# Patient Record
Sex: FEMALE
[De-identification: names, ages, dates, MRNs, and addresses within clinical notes are randomized per-mention and may not be internally consistent; named-entity substitution may affect disease eponyms.]

---

## 2018-05-21 ENCOUNTER — HOSPITAL ENCOUNTER (OUTPATIENT)
Dept: HOSPITAL 82 - ICU | Age: 73
Setting detail: OBSERVATION
LOS: 1 days | Discharge: HOME | End: 2018-05-22
Attending: INTERNAL MEDICINE | Admitting: INTERNAL MEDICINE
Payer: MEDICARE

## 2018-05-21 VITALS
HEIGHT: 57 IN | WEIGHT: 126.12 LBS | DIASTOLIC BLOOD PRESSURE: 61 MMHG | SYSTOLIC BLOOD PRESSURE: 175 MMHG | BODY MASS INDEX: 27.21 KG/M2

## 2018-05-21 VITALS — DIASTOLIC BLOOD PRESSURE: 57 MMHG | SYSTOLIC BLOOD PRESSURE: 142 MMHG

## 2018-05-21 VITALS — DIASTOLIC BLOOD PRESSURE: 53 MMHG | SYSTOLIC BLOOD PRESSURE: 117 MMHG

## 2018-05-21 VITALS — SYSTOLIC BLOOD PRESSURE: 132 MMHG | DIASTOLIC BLOOD PRESSURE: 62 MMHG

## 2018-05-21 VITALS — SYSTOLIC BLOOD PRESSURE: 126 MMHG | DIASTOLIC BLOOD PRESSURE: 64 MMHG

## 2018-05-21 VITALS — DIASTOLIC BLOOD PRESSURE: 39 MMHG | SYSTOLIC BLOOD PRESSURE: 91 MMHG

## 2018-05-21 DIAGNOSIS — Z95.1: ICD-10-CM

## 2018-05-21 DIAGNOSIS — I25.10: ICD-10-CM

## 2018-05-21 DIAGNOSIS — R55: Primary | ICD-10-CM

## 2018-05-21 DIAGNOSIS — R94.31: ICD-10-CM

## 2018-05-21 DIAGNOSIS — E78.5: ICD-10-CM

## 2018-05-21 DIAGNOSIS — Z79.84: ICD-10-CM

## 2018-05-21 DIAGNOSIS — E11.9: ICD-10-CM

## 2018-05-21 DIAGNOSIS — I11.9: ICD-10-CM

## 2018-05-21 DIAGNOSIS — Z79.899: ICD-10-CM

## 2018-05-21 NOTE — NUR
PT RESTING IN BED WITH EYES CLOSED. RESP ARE EVEN AND UNLABORED. NO DISTRESS
NOTED. SR ON MONITOR AND NOTED SOME SB. CALL LIGHT IN REACH. WILL CONTINUE
TO DA

## 2018-05-21 NOTE — NUR
PT RESTING IN BED AWAKE. FAMILY IN ROOM. PT IS ALERT AND ORIENTED X3. PERRLA.
RESP ARE EVEN AND UNLABORED. NO DISTRESS NOTED. LUNGS ARE CLEAR. HR REGULAR.
SR ON MONITOR. PT REPORTS DIZZINESS UPON STANDING. ORTHOSTATIC BPS OBTAINED.
LYING /59, SITTIGN /53, STANDING /61. PT DID BECOME
UNSTEADY UPON STANDING AND REPORTED DIZZINESS. NO EDEMA NOTED. PULSES PALPABLE
THROUGHOUT. BS ACTIVE. #20 RAC SALINE LOCKED. NO REDNESS OT EDEMA NOTED. CALL
LIGHT IN REACH. WILL CONTINUE TO MONITOR

## 2018-05-22 VITALS — DIASTOLIC BLOOD PRESSURE: 65 MMHG | SYSTOLIC BLOOD PRESSURE: 116 MMHG

## 2018-05-22 VITALS — SYSTOLIC BLOOD PRESSURE: 101 MMHG | DIASTOLIC BLOOD PRESSURE: 49 MMHG

## 2018-05-22 VITALS — SYSTOLIC BLOOD PRESSURE: 124 MMHG | DIASTOLIC BLOOD PRESSURE: 45 MMHG

## 2018-05-22 VITALS — DIASTOLIC BLOOD PRESSURE: 64 MMHG | SYSTOLIC BLOOD PRESSURE: 143 MMHG

## 2018-05-22 VITALS — SYSTOLIC BLOOD PRESSURE: 103 MMHG | DIASTOLIC BLOOD PRESSURE: 41 MMHG

## 2018-05-22 VITALS — DIASTOLIC BLOOD PRESSURE: 54 MMHG | SYSTOLIC BLOOD PRESSURE: 140 MMHG

## 2018-05-22 LAB
ANION GAP SERPL CALCULATED.3IONS-SCNC: 17 MMOL/L
BUN SERPL-MCNC: 17 MG/DL (ref 8–23)
BUN/CREAT SERPL: 23
CHLORIDE SERPL-SCNC: 106 MMOL/L (ref 95–108)
CHOLEST SERPL-MCNC: 182 MG/DL (ref 0–199)
CHOLEST/HDLC SERPL: 3.4 {RATIO}
CO2 SERPL-SCNC: 23 MMOL/L (ref 22–30)
CREAT SERPL-MCNC: 0.7 MG/DL (ref 0.5–1)
HDLC SERPL-MCNC: 53 MG/DL (ref 40–?)
LDLC SERPL CALC-MCNC: 98 MG/DL
MAGNESIUM SERPL-MCNC: 1.8 MG/DL (ref 1.6–2.3)
POTASSIUM SERPL-SCNC: 4.2 MMOL/L (ref 3.5–5.1)
SODIUM SERPL-SCNC: 141 MMOL/L (ref 137–146)
TRIGL SERPL-MCNC: 153 MG/DL (ref 30–149)
VLDLC SERPL CALC-MCNC: 31 MG/DL

## 2018-05-22 NOTE — NUR
PT LAYING IN THE BED RESTING WITH EYES OPENED, A & O X3, PERRL, PT VERBALIZES
NO COMPLAINTS, PT DENIES ANY DIZZINESS OR NAUSEA, HR 50, RESP. 18, /54,
O2 97% ON RA, LUNG SOUNDS CLEAR IN ALL FIELDS, STRONG RADIAL AND PEDAL PULSES,
ACTIVE BOWEL SOUNDS, 20G RAC IV, SALINE LOCKED, AM ASSESSMENT COMPLETE, SEE
INTERVENTIONS, SAFETY MEASURES REINFORCED, CALL BELL WITHIN REACH

## 2018-05-22 NOTE — NUR
PT RESTING IN BED WITH EYES CLOSED. RESP ARE EVEN AND UNLABORED. NO DISTRESS
NOTED. REMAINS SB TO SR  ON MONITOR. WILL CONTINUE TO MONITOR. CALL LIGHT IN
REACH.

## 2018-05-22 NOTE — NUR
PT RESTING IN BED WITH EYES CLOSED. RESP ARE EVEN AND UNLABORED. NO DISTRESS
NOTED. REMAINS SB TO SR ON MONITOR. CALL LIGHT IN REACH. WILL CONTINUE TO
MONITOR

## 2018-05-22 NOTE — NUR
PT RESTING IN BED WITH EYES CLOSED. RESP ARE EVEN AND UNLABORED. NO DISTRESS
NOTED. REAMINS SB TO SR ON MONITOR. NOTED BP 92/49. CALL LIGHT IN REACH WILL
CONTINUE TO MONTIORO

## 2018-10-05 ENCOUNTER — HOSPITAL ENCOUNTER (OUTPATIENT)
Dept: HOSPITAL 82 - MRI | Age: 73
Discharge: HOME | End: 2018-10-05
Attending: PODIATRIST
Payer: MEDICARE

## 2018-10-05 DIAGNOSIS — M79.675: Primary | ICD-10-CM
